# Patient Record
Sex: FEMALE | Race: WHITE | Employment: FULL TIME | URBAN - METROPOLITAN AREA
[De-identification: names, ages, dates, MRNs, and addresses within clinical notes are randomized per-mention and may not be internally consistent; named-entity substitution may affect disease eponyms.]

---

## 2020-12-21 ENCOUNTER — OFFICE VISIT (OUTPATIENT)
Dept: DERMATOLOGY | Facility: CLINIC | Age: 40
End: 2020-12-21
Payer: COMMERCIAL

## 2020-12-21 DIAGNOSIS — L25.9 CONTACT DERMATITIS, UNSPECIFIED CONTACT DERMATITIS TYPE, UNSPECIFIED TRIGGER: ICD-10-CM

## 2020-12-21 DIAGNOSIS — L90.0 LICHEN SCLEROSUS: Primary | ICD-10-CM

## 2020-12-21 PROCEDURE — 87077 CULTURE AEROBIC IDENTIFY: CPT | Performed by: DERMATOLOGY

## 2020-12-21 PROCEDURE — 87070 CULTURE OTHR SPECIMN AEROBIC: CPT | Performed by: DERMATOLOGY

## 2020-12-21 PROCEDURE — 99202 OFFICE O/P NEW SF 15 MIN: CPT | Performed by: DERMATOLOGY

## 2020-12-21 PROCEDURE — 87186 SC STD MICRODIL/AGAR DIL: CPT | Performed by: DERMATOLOGY

## 2020-12-21 RX ORDER — CLOBETASOL PROPIONATE 0.5 MG/G
OINTMENT TOPICAL
Qty: 60 G | Refills: 11 | Status: SHIPPED | OUTPATIENT
Start: 2020-12-21

## 2020-12-21 RX ORDER — TACROLIMUS 1 MG/G
OINTMENT TOPICAL
Qty: 60 G | Refills: 3 | Status: SHIPPED | OUTPATIENT
Start: 2020-12-21

## 2020-12-21 RX ORDER — TRIAMCINOLONE ACETONIDE 0.25 MG/G
OINTMENT TOPICAL
Qty: 80 G | Refills: 3 | Status: SHIPPED | OUTPATIENT
Start: 2020-12-21

## 2020-12-21 NOTE — PATIENT INSTRUCTIONS
Start clobetasol twice a day for 2-4 weeks until the area is not itchy. At that point, start applying Protopic to the area daily. You can use clobetasol for treatment of flares.

## 2020-12-21 NOTE — PROGRESS NOTES
"[facility] Dermatology Note    Dermatology Problem List:  1. New Patient  2. Lichen sclerosus  -current tx: clobetasol  -previous tx: tacrolimus  3. Family hx of NMSC    Encounter Date: Dec 21, 2020    CC:  Chief Complaint   Patient presents with     Skin Check     FBSE, lichen sclerosis x2-3 years, referred by OBGYN, rash on left anterior neck x1 month       History of Present Illness:  Ms. Isela Solis is a 40 year old female who presents for lichen sclerosis. They are a new patient, and last saw a dermatologist in 2010. She was told by her OB/GYN to follow with a dermatologist for treatment of her lichen sclerosus. Today, she has the following concerns:    1) Lichen sclerosus - she notes this is \"out of control\" and started a few years ago. Her OB/GYN did a biopsy \"years ago\". She has been using clobetasol, which has not improved her sx. She admits she has not regularly been using clobetasol. She tried tacrolimus as well, which caused a burning sensation. She has not been using clobetasol regularly. She notes this area is not itchy at the time of her visit, but this area does flare. Denies any sores in the area.  2) Left Anterior Neck - rash x 1 month, this area is occasionally itchy and will occasionally become more red, but has not changed shape. She notes she has several allergies, and believes this may be due to an allergic reaction. She's had patch testing done 20 years ago, noting they did 100's of patches.      Past Medical History:   There is no problem list on file for this patient.    No past medical history on file.  No past surgical history on file.    Social History:  Patient     Family History:  No family history on file.    Medications:  No current outpatient medications on file.       Allergies   Allergen Reactions     Lactose      No Clinical Screening - See Comments Nausea and Vomiting     PN: Shell fish     Thimerosal      PN: Reaction: unknown       Review of " Systems:  -As per HPI  -Constitutional: Otherwise feeling well today, in usual state of health.  -Skin: As above in HPI. No additional skin concerns.    Physical exam:  Vitals: There were no vitals taken for this visit.  GEN: This is a well developed, well-nourished female in no acute distress, in a pleasant mood.    SKIN: Focused examination of the genitals and neck was performed.  -Garcia skin type: II  -Genital Exam: Hypopigmented, minimally atrophic thin plaques on labia minor and clitoral barahona. No erosions appreciated.   -Neck: Few pink, minimally scaly thin papules and plaques on bilateral anterior and lack neck  -No other lesions of concern on areas examined.     Labs:  One swab taken.    Impression/Plan:    1. Lichen sclerosus, genital. Appear relatively well controlled on exam today. Discussed chronic nature with waxing/waning symptoms. Recommended using clobetasol 0.05% ointment BID x 2-4 weeks with transition to protopic 0.1% ointment for maintenance therapy. Did take bacterial swab to r/o infectious component to symptoms.     - F/u Anaerobic/aerobic bacterial swab  - Start clobetasol BID for 2-4 weeks, followed by Protopic daily.  - Recommended at least year screening for skin cancer.     2. Dermatitis, bilateral neck. Suspect irritant versus ACD contact dermatitis. Patient reports has prior had patch testing > 20 years. If not improved, consider repeat patch testing.     - Start triamcinolone 0.25% cream to the affected areas    Follow-up in 3 months for a follow up and FBSE, earlier for new or changing lesions.     Staff Involved:  Scribe/Staff    Scribe Disclosure:   I, Zander Yap, am serving as a scribe to document services personally performed by this physician, Dr. Moody Funes, based on data collection and the provider's statements to me.     Provider Disclosure:   The documentation recorded by the scribe accurately reflects the services I personally performed and the decisions made  by me.    Moody Funes MD    Department of Dermatology  [facility]: Phone: [phone], Fax:[phone]  [facility]: Phone: [phone] Fax: [phone]

## 2020-12-21 NOTE — LETTER
"    12/21/2020         RE: Isela Solis  [address] [address]        Dear Colleague,    Thank you for referring your patient, Isela Solis, to the [facility]. Please see a copy of my visit note below.    [facility] Dermatology Note    Dermatology Problem List:  1. New Patient  2. Lichen sclerosus  -current tx: clobetasol  -previous tx: tacrolimus  3. Family hx of NMSC    Encounter Date: Dec 21, 2020    CC:  Chief Complaint   Patient presents with     Skin Check     FBSE, lichen sclerosis x2-3 years, referred by OBGYN, rash on left anterior neck x1 month       History of Present Illness:  Ms. Isela Solis is a 40 year old female who presents for lichen sclerosis. They are a new patient, and last saw a dermatologist in 2010. She was told by her OB/GYN to follow with a dermatologist for treatment of her lichen sclerosus. Today, she has the following concerns:    1) Lichen sclerosus - she notes this is \"out of control\" and started a few years ago. Her OB/GYN did a biopsy \"years ago\". She has been using clobetasol, which has not improved her sx. She admits she has not regularly been using clobetasol. She tried tacrolimus as well, which caused a burning sensation. She has not been using clobetasol regularly. She notes this area is not itchy at the time of her visit, but this area does flare. Denies any sores in the area.  2) Left Anterior Neck - rash x 1 month, this area is occasionally itchy and will occasionally become more red, but has not changed shape. She notes she has several allergies, and believes this may be due to an allergic reaction. She's had patch testing done 20 years ago, noting they did 100's of patches.      Past Medical History:   There is no problem list on file for this patient.    No past medical history on file.  No past surgical history on file.    Social History:  Patient     Family History:  No family history on " file.    Medications:  No current outpatient medications on file.       Allergies   Allergen Reactions     Lactose      No Clinical Screening - See Comments Nausea and Vomiting     PN: Shell fish     Thimerosal      PN: Reaction: unknown       Review of Systems:  -As per HPI  -Constitutional: Otherwise feeling well today, in usual state of health.  -Skin: As above in HPI. No additional skin concerns.    Physical exam:  Vitals: There were no vitals taken for this visit.  GEN: This is a well developed, well-nourished female in no acute distress, in a pleasant mood.    SKIN: Focused examination of the genitals and neck was performed.  -Garcia skin type: II  -Genital Exam: Hypopigmented, minimally atrophic thin plaques on labia minor and clitoral barahona. No erosions appreciated.   -Neck: Few pink, minimally scaly thin papules and plaques on bilateral anterior and lack neck  -No other lesions of concern on areas examined.     Labs:  One swab taken.    Impression/Plan:    1. Lichen sclerosus, genital. Appear relatively well controlled on exam today. Discussed chronic nature with waxing/waning symptoms. Recommended using clobetasol 0.05% ointment BID x 2-4 weeks with transition to protopic 0.1% ointment for maintenance therapy. Did take bacterial swab to r/o infectious component to symptoms.     - F/u Anaerobic/aerobic bacterial swab  - Start clobetasol BID for 2-4 weeks, followed by Protopic daily.  - Recommended at least year screening for skin cancer.     2. Dermatitis, bilateral neck. Suspect irritant versus ACD contact dermatitis. Patient reports has prior had patch testing > 20 years. If not improved, consider repeat patch testing.     - Start triamcinolone 0.25% cream to the affected areas    Follow-up in 3 months for a follow up and FBSE, earlier for new or changing lesions.     Staff Involved:  Scribe/Staff    Scribe Disclosure:   Zander TRIPP, am serving as a scribe to document services personally  performed by this physician, Dr. Moody Funes, based on data collection and the provider's statements to me.     Provider Disclosure:   The documentation recorded by the scribe accurately reflects the services I personally performed and the decisions made by me.    Moody Funes MD    Department of Dermatology  [facility]: Phone: [phone], Fax:[phone]  [facility]: Phone: [phone] Fax: [phone]            Again, thank you for allowing me to participate in the care of your patient.        Sincerely,        Moody Funes MD

## 2020-12-21 NOTE — NURSING NOTE
Isela Solis's goals for this visit include:   Chief Complaint   Patient presents with     Skin Check     FBSE, lichen sclerosis x2-3 years, referred by OBGYN, rash on left anterior neck x1 month     She requests these members of her care team be copied on today's visit information: No    PCP: No Ref-Primary, Physician    Referring Provider:  No referring provider defined for this encounter.    There were no vitals taken for this visit.    Do you need any medication refills at today's visit? No      Caroline Dupree LPN

## 2020-12-23 LAB
BACTERIA SPEC CULT: ABNORMAL
BACTERIA SPEC CULT: ABNORMAL
Lab: ABNORMAL
SPECIMEN SOURCE: ABNORMAL

## 2020-12-24 ENCOUNTER — TELEPHONE (OUTPATIENT)
Dept: DERMATOLOGY | Facility: CLINIC | Age: 40
End: 2020-12-24

## 2020-12-24 NOTE — TELEPHONE ENCOUNTER
----- Message from Moody Funes MD sent at 12/24/2020 10:02 AM CST -----  Can we call patient and just let her know that was only light growth of E.coli on her bacterial culture - which is normal in that area of the body and I don't think contributing to her symptoms. I'd like her to continue with the topical therapies discussed at our last visit and we'll see her in March as planned.     Moody Funes MD  Pronouns: he/him/his    Department of Dermatology  [facility]: Phone: [phone], Fax:[phone]  [facility]: Phone: [phone] Fax: [phone]

## 2020-12-24 NOTE — TELEPHONE ENCOUNTER
Caroline Dupree LPN   12/24/2020 10:08 AM CST      Phone call to patient. Discussed results and Dr. Funes's recommendations. Patient appreciated call and had no further questions.     SABIHA Burnett, Moody Love MD   12/24/2020 10:02 AM CST      Can we call patient and just let her know that was only light growth of E.coli on her bacterial culture - which is normal in that area of the body and I don't think contributing to her symptoms. I'd like her to continue with the topical therapies discussed at our last visit and we'll see her in March as planned.      Moody Funes MD  Pronouns: he/him/his    Department of Dermatology  [facility]: Phone: [phone], Fax:[phone]  [facility]: Phone: [phone] Fax: [phone]

## 2020-12-24 NOTE — RESULT ENCOUNTER NOTE
Can we call patient and just let her know that was only light growth of E.coli on her bacterial culture - which is normal in that area of the body and I don't think contributing to her symptoms. I'd like her to continue with the topical therapies discussed at our last visit and we'll see her in March as planned.     Moody Funes MD  Pronouns: he/him/his    Department of Dermatology  [facility]: Phone: [phone], Fax:[phone]  [facility]: Phone: [phone] Fax: [phone]

## 2021-05-22 ENCOUNTER — HEALTH MAINTENANCE LETTER (OUTPATIENT)
Age: 41
End: 2021-05-22

## 2021-09-11 ENCOUNTER — HEALTH MAINTENANCE LETTER (OUTPATIENT)
Age: 41
End: 2021-09-11

## 2022-06-18 ENCOUNTER — HEALTH MAINTENANCE LETTER (OUTPATIENT)
Age: 42
End: 2022-06-18

## 2022-10-29 ENCOUNTER — HEALTH MAINTENANCE LETTER (OUTPATIENT)
Age: 42
End: 2022-10-29

## 2023-06-25 ENCOUNTER — HEALTH MAINTENANCE LETTER (OUTPATIENT)
Age: 43
End: 2023-06-25